# Patient Record
Sex: MALE | Race: WHITE | Employment: FULL TIME | URBAN - METROPOLITAN AREA
[De-identification: names, ages, dates, MRNs, and addresses within clinical notes are randomized per-mention and may not be internally consistent; named-entity substitution may affect disease eponyms.]

---

## 2020-10-23 ENCOUNTER — APPOINTMENT (OUTPATIENT)
Dept: ULTRASOUND IMAGING | Age: 30
End: 2020-10-23
Payer: COMMERCIAL

## 2020-10-23 ENCOUNTER — HOSPITAL ENCOUNTER (EMERGENCY)
Age: 30
Discharge: HOME | End: 2020-10-23
Attending: EMERGENCY MEDICINE
Payer: COMMERCIAL

## 2020-10-23 VITALS
DIASTOLIC BLOOD PRESSURE: 68 MMHG | SYSTOLIC BLOOD PRESSURE: 107 MMHG | HEART RATE: 80 BPM | WEIGHT: 256 LBS | RESPIRATION RATE: 16 BRPM | HEIGHT: 71 IN | BODY MASS INDEX: 35.84 KG/M2 | OXYGEN SATURATION: 98 % | TEMPERATURE: 98.2 F

## 2020-10-23 PROCEDURE — 99283 EMERGENCY DEPT VISIT LOW MDM: CPT

## 2020-10-23 PROCEDURE — 76870 US EXAM SCROTUM: CPT

## 2020-10-23 ASSESSMENT — ENCOUNTER SYMPTOMS
DIARRHEA: 0
ABDOMINAL PAIN: 0
CONSTIPATION: 0
SHORTNESS OF BREATH: 0
VOMITING: 0
COUGH: 0
COLOR CHANGE: 1
NAUSEA: 0
SINUS PRESSURE: 0

## 2020-10-23 NOTE — ED TRIAGE NOTES
Pt to ED c/o scrotal abscess that has ruptured. Pt AOx4. Denies pain or dysuria. Paten airway, no dyspnea or cyanosis noted.

## 2020-10-23 NOTE — ED PROVIDER NOTES
eMERGENCY dEPARTMENT eNCOUnter   Independent Attestation     Pt Name: Vel Jose  MRN: [id number]  [place] 1990  Date of evaluation: 10/23/20     Vel Jose is a 31 y.o. male with CC: Abscess      Based on the medical record the care appears appropriate. I was personally available for consultation in the Emergency Department.     Khanh Wilson MD  Attending Emergency Physician                 Miladys Sahni MD  10/23/20 5200

## 2020-10-23 NOTE — ED PROVIDER NOTES
[address] [place] [address] ED  EMERGENCY DEPARTMENT ENCOUNTER      Pt Name: Ramiro Giordano  MRN: [id number]  [place] 1990  Date of evaluation: 10/23/20  CHIEF COMPLAINT       Chief Complaint   Patient presents with    Abscess     HISTORY OF PRESENT ILLNESS   Presents emergency room via private auto with swelling and bleeding from the left testicle. He states that he has had some swelling to the left testicle for a year. He had an ultrasound at symptom onset which did not have any evidence of torsion. He has been followed by a urologist.  Over the past 3 months the swelling has worsened and today he developed some bleeding from the left testicle. No injury or trauma. Bleeding has since stopped. He has what appears to be a moderate to large amount of blood on his underpants. The history is provided by the patient. REVIEW OF SYSTEMS     Review of Systems   Constitutional: Negative for activity change and fever. HENT: Negative for congestion and sinus pressure. Respiratory: Negative for cough and shortness of breath. Cardiovascular: Negative for chest pain and palpitations. Gastrointestinal: Negative for abdominal pain, constipation, diarrhea, nausea and vomiting. Genitourinary: Positive for scrotal swelling and testicular pain. Negative for difficulty urinating, discharge, flank pain and frequency. Musculoskeletal: Negative for arthralgias and myalgias. Skin: Positive for color change and wound. Neurological: Negative for dizziness and light-headedness. Psychiatric/Behavioral: Negative for confusion and decreased concentration.      PASTMEDICAL HISTORY     Past Medical History:   Diagnosis Date    Asperger's syndrome     Seasonal allergies      SURGICAL HISTORY       Past Surgical History:   Procedure Laterality Date    TYMPANOSTOMY TUBE PLACEMENT      at age 3     CURRENT MEDICATIONS       Discharge Medication List as of 10/23/2020  4:56 PM      CONTINUE these medications which have NOT CHANGED    Details   PARoxetine (PAXIL) 10 MG tablet Take 1 tablet by mouth every morning, Disp-30 tablet, R-0      ALPRAZolam (XANAX) 0.5 MG tablet Take 1 tablet by mouth 3 times daily as needed for Anxiety (for breakthough anxiety), Disp-12 tablet, R-0           ALLERGIES     has No Known Allergies. FAMILY HISTORY     has no family status information on file. SOCIAL HISTORY       Social History     Tobacco Use    Smoking status: Never Smoker    Smokeless tobacco: Never Used   Substance Use Topics    Alcohol use: Yes     Comment: socially    Drug use: No     PHYSICAL EXAM     INITIAL VITALS: /68   Pulse 80   Temp 98.2 °F (36.8 °C)   Resp 16   Ht 5' 11\" (1.803 m)   Wt 256 lb (116.1 kg)   SpO2 98%   BMI 35.70 kg/m²    Physical Exam  Constitutional:       Appearance: Normal appearance. HENT:      Right Ear: External ear normal.      Left Ear: External ear normal.   Eyes:      General:         Right eye: No discharge. Left eye: No discharge. Conjunctiva/sclera: Conjunctivae normal.   Cardiovascular:      Rate and Rhythm: Normal rate and regular rhythm. Pulmonary:      Effort: Pulmonary effort is normal.      Breath sounds: Normal breath sounds. Abdominal:      General: Bowel sounds are normal.      Palpations: Abdomen is soft. Tenderness: There is no abdominal tenderness. Genitourinary:     Comments: Large amount of swelling to the left scrotum/testicle. No visible wound. No current bleeding. He does have some redness. No significant discomfort with palpation. No palpable mass  Skin:     General: Skin is warm and dry. Neurological:      General: No focal deficit present. Mental Status: He is alert and oriented to person, place, and time. Psychiatric:         Mood and Affect: Mood normal.         Thought Content:  Thought content normal.         DIAGNOSTIC RESULTS     RADIOLOGY:All plain film, CT, MRI, and formal ultrasound images (except ED bedside ultrasound) are read by the radiologist, see reports below, unless otherwise noted in MDM or here. Interpretation per the Radiologist below, if available at the time of this note:    [place] [address]   Preliminary Result   Very large simple left-sided hydrocele. Small right-sided hydrocele. Otherwise unremarkable scrotal ultrasound. Normal Doppler flow to the testes   bilaterally. LABS:  Labs Reviewed - No data to display    All other labs were within normal range or not returned as of this dictation. EMERGENCY DEPARTMENT COURSE and DIFFERENTIAL DIAGNOSIS/MDM:   Vitals:    Vitals:    10/23/20 1409 10/23/20 1411 10/23/20 1420   BP: (!) 144/89  107/68   Pulse: 77  80   Resp: 14  16   Temp: 98.6 °F (37 °C)  98.2 °F (36.8 °C)   TempSrc: Oral     SpO2:  97% 98%   Weight: 256 lb (116.1 kg)     Height: 5' 11\" (1.803 m)         Medical Decision Making: Clear ultrasound shows large hydrocele on the left and small hydrocele on the right. He see's Dr. Alissa Hester, urology. I was able to speak with his partner, Dr. Marycruz Monge who agrees he can be discharged home. He is to call the office Monday morning to schedule a follow-up appointment. The patient was given the following meds in the ED:  No orders of the defined types were placed in this encounter. FINAL IMPRESSION      1.  Hydrocele of testis          DISPOSITION/PLAN   DISPOSITION Decision To Discharge 10/23/2020 04:52:40 PM      PATIENT REFERRED TO:   Petros Erwin MD  [address]  [facility] [phone]      Call on Monday      DISCHARGE MEDICATIONS:     Discharge Medication List as of 10/23/2020  4:56 PM          CRITICAL CARE TIME       Please note that portions of this note were completed with a voice recognition program.      NINI RIVAS, PREET - PREET Barrett - CNP  10/23/20 2681